# Patient Record
Sex: FEMALE | Race: WHITE | Employment: UNEMPLOYED | URBAN - METROPOLITAN AREA
[De-identification: names, ages, dates, MRNs, and addresses within clinical notes are randomized per-mention and may not be internally consistent; named-entity substitution may affect disease eponyms.]

---

## 2023-04-26 ENCOUNTER — OFFICE VISIT (OUTPATIENT)
Dept: ORTHOPEDIC SURGERY | Age: 23
End: 2023-04-26

## 2023-04-26 VITALS — HEIGHT: 64 IN | BODY MASS INDEX: 22.2 KG/M2 | WEIGHT: 130 LBS | RESPIRATION RATE: 15 BRPM

## 2023-04-26 DIAGNOSIS — M65.4 DE QUERVAIN'S DISEASE (RADIAL STYLOID TENOSYNOVITIS): Primary | ICD-10-CM

## 2023-04-26 RX ORDER — LIDOCAINE HYDROCHLORIDE 10 MG/ML
0.5 INJECTION, SOLUTION INFILTRATION; PERINEURAL ONCE
Status: COMPLETED | OUTPATIENT
Start: 2023-04-26 | End: 2023-04-26

## 2023-04-26 RX ORDER — TRIAMCINOLONE ACETONIDE 40 MG/ML
20 INJECTION, SUSPENSION INTRA-ARTICULAR; INTRAMUSCULAR ONCE
Status: COMPLETED | OUTPATIENT
Start: 2023-04-26 | End: 2023-04-26

## 2023-04-26 RX ADMIN — TRIAMCINOLONE ACETONIDE 20 MG: 40 INJECTION, SUSPENSION INTRA-ARTICULAR; INTRAMUSCULAR at 16:19

## 2023-04-26 RX ADMIN — LIDOCAINE HYDROCHLORIDE 0.5 ML: 10 INJECTION, SOLUTION INFILTRATION; PERINEURAL at 16:20

## 2023-04-26 NOTE — PROGRESS NOTES
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  Symptoms have been present for 6 months. Pain starts at the radial aspect of the wrist and may radiate proximally, towards the elbow or distally, towards the thumb. There is no pain at rest.  Pain is aggravated by wrist and thumb movement and gripping. Occasionally, a popping sensation is noted on thumb movement. The wrist swells at times. Treatment has not been prescribed. The pain has worsened recently. There is no history of significant injury. The pain assessment has been reviewed and is correct as stated. The patient's social history, past medical history, family history, medications, allergies and review of systems, entered 4/26/23, have been reviewed, and dated and are recorded in the chart. On examination the patient is Height: 5' 4\" (162.6 cm) tall and weighs Weight: 130 lb (59 kg). Respirations are 18 per minute. The patient is well nourished, is oriented to time and place, demonstrates appropriate mood and affect as well as normal gait and station. There is soft tissue swelling over the radial styloid of the wrist.  There is tenderness over the 1st dorsal compartment. There is no tenderness over the base of the thumb metacarpal.  The grind test is negative for CMC arthritis. The Carmi test is positive. Range of motion of the fingers, thumbs and wrists is full bilaterally, with flexion and ulnar deviation being painful on the right. Skin is intact, as is distal circulation and sensation. Gross muscle strength is normal bilaterally. Hand and wrist joints are stable. There are no subcutaneous nodules or enlarged epitrochlear lymph nodes. I have personally reviewed and interpreted all previous external imaging studies, laboratory tests, diagnostic proceedures and medical encounters pertinent to this patient's visit today.     Impression: